# Patient Record
Sex: MALE | Race: ASIAN | NOT HISPANIC OR LATINO | URBAN - METROPOLITAN AREA
[De-identification: names, ages, dates, MRNs, and addresses within clinical notes are randomized per-mention and may not be internally consistent; named-entity substitution may affect disease eponyms.]

---

## 2023-12-18 ENCOUNTER — OFFICE VISIT (OUTPATIENT)
Dept: URBAN - METROPOLITAN AREA CLINIC 23 | Facility: CLINIC | Age: 53
End: 2023-12-18

## 2023-12-18 DIAGNOSIS — E80.6 HYPERBILIRUBINEMIA: ICD-10-CM

## 2023-12-18 PROBLEM — 14783006: Status: ACTIVE | Noted: 2023-12-18

## 2023-12-18 NOTE — HPI-TODAY'S VISIT:
53M presents for hyperbilirubinemia. Noted on annual check up. Many years.  Ranges 1-2.  Denies fhx of liver dz. Denies excessive etoh use. Denies RUQ pain. US with PCP unremarkable but fatty liver.

## 2023-12-22 LAB
ALBUMIN/GLOBULIN RATIO: 2
ALBUMIN: 4.3
ALKALINE PHOSPHATASE: 97
ALT (SGPT): 19
AST (SGOT): 19
BILIRUBIN, DIRECT: 0.3
BILIRUBIN, INDIRECT: 1.2
BILIRUBIN, TOTAL: 1.5
GLOBULIN: 2.1
HAPTOGLOBIN: 96
LD: 101
Lab: (no result)
PROTEIN, TOTAL: 6.4

## 2024-01-08 ENCOUNTER — TELEPHONE ENCOUNTER (OUTPATIENT)
Dept: URBAN - METROPOLITAN AREA CLINIC 23 | Facility: CLINIC | Age: 54
End: 2024-01-08

## 2024-03-13 PROBLEM — 197321007: Status: ACTIVE | Noted: 2024-03-13
